# Patient Record
Sex: FEMALE | Race: OTHER | URBAN - METROPOLITAN AREA
[De-identification: names, ages, dates, MRNs, and addresses within clinical notes are randomized per-mention and may not be internally consistent; named-entity substitution may affect disease eponyms.]

---

## 2022-06-07 NOTE — PATIENT DISCUSSION
CL trials dispensed OU. Patient would like to trial vs Hab Rx before finalizing. Can call to finalize or RTC for CL f/u if adjustments needed.

## 2022-08-23 ENCOUNTER — COMPREHENSIVE EXAM (OUTPATIENT)
Dept: URBAN - METROPOLITAN AREA CLINIC 43 | Facility: CLINIC | Age: 67
End: 2022-08-23

## 2022-08-23 DIAGNOSIS — H25.9: ICD-10-CM

## 2022-08-23 DIAGNOSIS — H40.033: ICD-10-CM

## 2022-08-23 DIAGNOSIS — H52.4: ICD-10-CM

## 2022-08-23 DIAGNOSIS — H04.123: ICD-10-CM

## 2022-08-23 DIAGNOSIS — H43.813: ICD-10-CM

## 2022-08-23 PROCEDURE — 92132 CPTRZD OPH DX IMG ANT SGM: CPT

## 2022-08-23 PROCEDURE — 92014 COMPRE OPH EXAM EST PT 1/>: CPT

## 2022-08-23 PROCEDURE — 92015 DETERMINE REFRACTIVE STATE: CPT

## 2022-08-23 ASSESSMENT — VISUAL ACUITY
OD_SC: J3
OS_SC: J10
OU_CC: 20/25-1
OD_SC: 20/30-1
OS_BAT: 20/40
OS_SC: 20/50
OS_CC: 20/30-2
OD_BAT: 20/40
OD_CC: J1
OS_CC: J1
OU_SC: 20/30-2
OD_CC: 20/30-1

## 2022-08-23 ASSESSMENT — TONOMETRY
OD_IOP_MMHG: 14
OS_IOP_MMHG: 14

## 2022-11-29 ENCOUNTER — CONSULTATION/EVALUATION (OUTPATIENT)
Dept: URBAN - METROPOLITAN AREA CLINIC 43 | Facility: CLINIC | Age: 67
End: 2022-11-29

## 2022-11-29 DIAGNOSIS — H40.033: ICD-10-CM

## 2022-11-29 PROCEDURE — 92020 GONIOSCOPY: CPT

## 2022-11-29 PROCEDURE — 76514 ECHO EXAM OF EYE THICKNESS: CPT

## 2022-11-29 PROCEDURE — 92132 CPTRZD OPH DX IMG ANT SGM: CPT

## 2022-11-29 PROCEDURE — 92004 COMPRE OPH EXAM NEW PT 1/>: CPT

## 2022-11-29 ASSESSMENT — TONOMETRY
OS_IOP_MMHG: 13
OD_IOP_MMHG: 14

## 2022-11-29 ASSESSMENT — VISUAL ACUITY
OD_CC: 20/20-1
OD_SC: J1-
OD_SC: 20/25-2
OS_SC: 20/40
OD_CC: J1
OS_CC: 20/25+2
OS_CC: J1
OS_SC: J1-

## 2022-11-29 ASSESSMENT — PACHYMETRY
OS_CT_UM: 579
OD_CT_UM: 599

## 2023-08-24 ENCOUNTER — COMPREHENSIVE EXAM (OUTPATIENT)
Dept: URBAN - METROPOLITAN AREA CLINIC 43 | Facility: CLINIC | Age: 68
End: 2023-08-24

## 2023-08-24 DIAGNOSIS — Z79.899: ICD-10-CM

## 2023-08-24 DIAGNOSIS — H52.4: ICD-10-CM

## 2023-08-24 DIAGNOSIS — H25.9: ICD-10-CM

## 2023-08-24 DIAGNOSIS — H43.813: ICD-10-CM

## 2023-08-24 DIAGNOSIS — H04.123: ICD-10-CM

## 2023-08-24 PROCEDURE — 92012 INTRM OPH EXAM EST PATIENT: CPT

## 2023-08-24 PROCEDURE — 92015 DETERMINE REFRACTIVE STATE: CPT

## 2023-08-24 ASSESSMENT — VISUAL ACUITY
OD_CC: 20/25-1
OS_CC: 20/30-1
OS_SC: J12
OD_CC: J3
OD_SC: J6
OD_SC: 20/25
OS_CC: J1
OS_SC: 20/40

## 2023-08-24 ASSESSMENT — TONOMETRY
OS_IOP_MMHG: 12
OD_IOP_MMHG: 12

## 2023-09-05 ENCOUNTER — FOLLOW UP (OUTPATIENT)
Dept: URBAN - METROPOLITAN AREA CLINIC 43 | Facility: CLINIC | Age: 68
End: 2023-09-05

## 2023-09-05 DIAGNOSIS — Z79.899: ICD-10-CM

## 2023-09-05 DIAGNOSIS — H40.033: ICD-10-CM

## 2023-09-05 PROCEDURE — 92012 INTRM OPH EXAM EST PATIENT: CPT

## 2023-09-05 PROCEDURE — 92083 EXTENDED VISUAL FIELD XM: CPT

## 2023-09-05 ASSESSMENT — VISUAL ACUITY
OD_CC: 20/20
OS_CC: 20/20

## 2023-09-05 ASSESSMENT — TONOMETRY
OS_IOP_MMHG: 12
OD_IOP_MMHG: 12

## 2023-12-19 ENCOUNTER — EMERGENCY VISIT (OUTPATIENT)
Dept: URBAN - METROPOLITAN AREA CLINIC 43 | Facility: CLINIC | Age: 68
End: 2023-12-19

## 2023-12-19 DIAGNOSIS — H43.813: ICD-10-CM

## 2023-12-19 PROCEDURE — 92012 INTRM OPH EXAM EST PATIENT: CPT

## 2023-12-19 ASSESSMENT — TONOMETRY
OD_IOP_MMHG: 17
OS_IOP_MMHG: 17

## 2023-12-19 ASSESSMENT — VISUAL ACUITY
OS_CC: 20/20-2
OD_CC: 20/20

## 2024-03-05 ENCOUNTER — FOLLOW UP (OUTPATIENT)
Dept: URBAN - METROPOLITAN AREA CLINIC 43 | Facility: CLINIC | Age: 69
End: 2024-03-05

## 2024-03-05 DIAGNOSIS — H40.1121: ICD-10-CM

## 2024-03-05 DIAGNOSIS — H40.033: ICD-10-CM

## 2024-03-05 DIAGNOSIS — H40.011: ICD-10-CM

## 2024-03-05 PROCEDURE — 92012 INTRM OPH EXAM EST PATIENT: CPT

## 2024-03-05 PROCEDURE — 92083 EXTENDED VISUAL FIELD XM: CPT

## 2024-03-05 ASSESSMENT — TONOMETRY
OS_IOP_MMHG: 16
OD_IOP_MMHG: 16

## 2024-03-05 ASSESSMENT — VISUAL ACUITY
OS_CC: 20/20
OD_CC: 20/20

## 2024-04-24 ENCOUNTER — FOLLOW UP (OUTPATIENT)
Dept: URBAN - METROPOLITAN AREA CLINIC 43 | Facility: CLINIC | Age: 69
End: 2024-04-24

## 2024-04-24 DIAGNOSIS — H40.1121: ICD-10-CM

## 2024-04-24 DIAGNOSIS — H40.011: ICD-10-CM

## 2024-04-24 DIAGNOSIS — H40.033: ICD-10-CM

## 2024-04-24 PROCEDURE — 92012 INTRM OPH EXAM EST PATIENT: CPT

## 2024-04-24 ASSESSMENT — TONOMETRY
OS_IOP_MMHG: 8
OD_IOP_MMHG: 11

## 2024-04-24 ASSESSMENT — VISUAL ACUITY
OS_CC: 20/25-2
OD_CC: 20/20-1

## 2024-12-02 ENCOUNTER — COMPREHENSIVE EXAM (OUTPATIENT)
Age: 69
End: 2024-12-02

## 2024-12-02 DIAGNOSIS — H40.011: ICD-10-CM

## 2024-12-02 DIAGNOSIS — Z79.899: ICD-10-CM

## 2024-12-02 DIAGNOSIS — H43.813: ICD-10-CM

## 2024-12-02 DIAGNOSIS — H40.1121: ICD-10-CM

## 2024-12-02 DIAGNOSIS — H25.9: ICD-10-CM

## 2024-12-02 DIAGNOSIS — H04.123: ICD-10-CM

## 2024-12-02 DIAGNOSIS — H52.4: ICD-10-CM

## 2024-12-02 DIAGNOSIS — H40.033: ICD-10-CM

## 2024-12-02 PROCEDURE — 92015 DETERMINE REFRACTIVE STATE: CPT

## 2024-12-02 PROCEDURE — 92132 CPTRZD OPH DX IMG ANT SGM: CPT

## 2024-12-02 PROCEDURE — 92014 COMPRE OPH EXAM EST PT 1/>: CPT

## 2025-04-02 ENCOUNTER — FOLLOW UP (OUTPATIENT)
Age: 70
End: 2025-04-02

## 2025-04-02 DIAGNOSIS — H40.033: ICD-10-CM

## 2025-04-02 DIAGNOSIS — H40.011: ICD-10-CM

## 2025-04-02 DIAGNOSIS — H40.1121: ICD-10-CM

## 2025-04-02 PROCEDURE — 92012 INTRM OPH EXAM EST PATIENT: CPT

## 2025-04-02 PROCEDURE — 92083 EXTENDED VISUAL FIELD XM: CPT
